# Patient Record
Sex: FEMALE | Race: WHITE | Employment: FULL TIME | URBAN - METROPOLITAN AREA
[De-identification: names, ages, dates, MRNs, and addresses within clinical notes are randomized per-mention and may not be internally consistent; named-entity substitution may affect disease eponyms.]

---

## 2024-08-27 ENCOUNTER — OFFICE VISIT (OUTPATIENT)
Dept: PRIMARY CARE CLINIC | Age: 34
End: 2024-08-27
Payer: MEDICARE

## 2024-08-27 VITALS
WEIGHT: 293 LBS | HEIGHT: 72 IN | DIASTOLIC BLOOD PRESSURE: 88 MMHG | OXYGEN SATURATION: 98 % | BODY MASS INDEX: 39.68 KG/M2 | HEART RATE: 110 BPM | SYSTOLIC BLOOD PRESSURE: 140 MMHG

## 2024-08-27 DIAGNOSIS — Z76.89 ENCOUNTER TO ESTABLISH CARE WITH NEW DOCTOR: Primary | ICD-10-CM

## 2024-08-27 DIAGNOSIS — Z76.89 ENCOUNTER TO ESTABLISH CARE WITH NEW DOCTOR: ICD-10-CM

## 2024-08-27 DIAGNOSIS — R55 PRE-SYNCOPE: ICD-10-CM

## 2024-08-27 DIAGNOSIS — R00.0 TACHYCARDIA: ICD-10-CM

## 2024-08-27 DIAGNOSIS — G43.909 MIGRAINE WITHOUT STATUS MIGRAINOSUS, NOT INTRACTABLE, UNSPECIFIED MIGRAINE TYPE: ICD-10-CM

## 2024-08-27 DIAGNOSIS — F39 MOOD DISORDER (HCC): ICD-10-CM

## 2024-08-27 DIAGNOSIS — F90.9 ADULT ADHD: ICD-10-CM

## 2024-08-27 DIAGNOSIS — R73.9 HYPERGLYCEMIA: ICD-10-CM

## 2024-08-27 DIAGNOSIS — M79.7 FIBROMYALGIA: ICD-10-CM

## 2024-08-27 LAB
ALBUMIN/GLOBULIN RATIO: 2 (ref 1–2.5)
ALBUMIN: 4.6 G/DL (ref 3.5–5.2)
ALP BLD-CCNC: 83 U/L (ref 35–104)
ALT SERPL-CCNC: 42 U/L (ref 10–35)
ANION GAP SERPL CALCULATED.3IONS-SCNC: 11 MMOL/L (ref 9–16)
AST SERPL-CCNC: 28 U/L (ref 10–35)
BASOPHILS ABSOLUTE: 0.07 K/UL (ref 0–0.2)
BASOPHILS RELATIVE PERCENT: 1 % (ref 0–2)
BILIRUB SERPL-MCNC: 0.3 MG/DL (ref 0–1.2)
BUN BLDV-MCNC: 10 MG/DL (ref 6–20)
CALCIUM SERPL-MCNC: 9.6 MG/DL (ref 8.6–10.4)
CHLORIDE BLD-SCNC: 104 MMOL/L (ref 98–107)
CO2: 23 MMOL/L (ref 20–31)
CREAT SERPL-MCNC: 0.8 MG/DL (ref 0.5–0.9)
EOSINOPHILS ABSOLUTE: 0.24 K/UL (ref 0–0.44)
EOSINOPHILS RELATIVE PERCENT: 2 % (ref 1–4)
ESTIMATED AVERAGE GLUCOSE: 100 MG/DL
GFR, ESTIMATED: >90 ML/MIN/1.73M2
GLUCOSE BLD-MCNC: 93 MG/DL (ref 74–99)
HBA1C MFR BLD: 5.1 % (ref 4–6)
HCT VFR BLD CALC: 39.8 % (ref 36.3–47.1)
HEMOGLOBIN: 13.9 G/DL (ref 11.9–15.1)
IMMATURE GRANULOCYTES %: 1 %
IMMATURE GRANULOCYTES ABSOLUTE: 0.05 K/UL (ref 0–0.3)
LYMPHOCYTES ABSOLUTE: 3.42 K/UL (ref 1.1–3.7)
LYMPHOCYTES RELATIVE PERCENT: 32 % (ref 24–43)
MCH RBC QN AUTO: 29.9 PG (ref 25.2–33.5)
MCHC RBC AUTO-ENTMCNC: 34.9 G/DL (ref 28.4–34.8)
MCV RBC AUTO: 85.6 FL (ref 82.6–102.9)
MONOCYTES ABSOLUTE: 0.78 K/UL (ref 0.1–1.2)
MONOCYTES RELATIVE PERCENT: 7 % (ref 3–12)
NEUTROPHILS ABSOLUTE: 6.2 K/UL (ref 1.5–8.1)
NEUTROPHILS RELATIVE PERCENT: 57 % (ref 36–65)
NRBC AUTOMATED: 0 PER 100 WBC
PDW BLD-RTO: 12.3 % (ref 11.8–14.4)
PLATELET # BLD: 484 K/UL (ref 138–453)
PMV BLD AUTO: 9.8 FL (ref 8.1–13.5)
POTASSIUM SERPL-SCNC: 4.4 MMOL/L (ref 3.7–5.3)
RBC # BLD: 4.65 M/UL (ref 3.95–5.11)
SODIUM BLD-SCNC: 138 MMOL/L (ref 136–145)
TOTAL PROTEIN: 7.5 G/DL (ref 6.6–8.7)
TSH SERPL DL<=0.05 MIU/L-ACNC: 2.3 UIU/ML (ref 0.27–4.2)
WBC # BLD: 10.8 K/UL (ref 3.5–11.3)

## 2024-08-27 PROCEDURE — G2211 COMPLEX E/M VISIT ADD ON: HCPCS | Performed by: STUDENT IN AN ORGANIZED HEALTH CARE EDUCATION/TRAINING PROGRAM

## 2024-08-27 PROCEDURE — 93000 ELECTROCARDIOGRAM COMPLETE: CPT | Performed by: STUDENT IN AN ORGANIZED HEALTH CARE EDUCATION/TRAINING PROGRAM

## 2024-08-27 PROCEDURE — 99205 OFFICE O/P NEW HI 60 MIN: CPT | Performed by: STUDENT IN AN ORGANIZED HEALTH CARE EDUCATION/TRAINING PROGRAM

## 2024-08-27 RX ORDER — IBUPROFEN 800 MG/1
800 TABLET, FILM COATED ORAL DAILY
COMMUNITY

## 2024-08-27 RX ORDER — AMITRIPTYLINE HYDROCHLORIDE 50 MG/1
50 TABLET ORAL NIGHTLY
COMMUNITY
Start: 2022-10-19

## 2024-08-27 RX ORDER — ATOMOXETINE 80 MG/1
80 CAPSULE ORAL
COMMUNITY

## 2024-08-27 RX ORDER — RIZATRIPTAN BENZOATE 10 MG/1
TABLET, ORALLY DISINTEGRATING ORAL
COMMUNITY
Start: 2023-01-14

## 2024-08-27 RX ORDER — FREMANEZUMAB-VFRM 225 MG/1.5ML
INJECTION SUBCUTANEOUS
COMMUNITY

## 2024-08-27 RX ORDER — GABAPENTIN 100 MG/1
300 CAPSULE ORAL 3 TIMES DAILY
COMMUNITY
Start: 2024-07-02

## 2024-08-27 RX ORDER — NORETHINDRONE ACETATE 5 MG
5 TABLET ORAL DAILY
COMMUNITY

## 2024-08-27 RX ORDER — LISINOPRIL 40 MG/1
40 TABLET ORAL DAILY
COMMUNITY
Start: 2024-04-22 | End: 2025-04-22

## 2024-08-27 RX ORDER — SPIRONOLACTONE 25 MG/1
25 TABLET ORAL DAILY
COMMUNITY

## 2024-08-27 RX ORDER — VENLAFAXINE HYDROCHLORIDE 150 MG/1
150 CAPSULE, EXTENDED RELEASE ORAL DAILY
COMMUNITY
Start: 2024-07-23

## 2024-08-27 RX ORDER — LORAZEPAM 1 MG/1
0.5 TABLET ORAL DAILY PRN
COMMUNITY

## 2024-08-27 RX ORDER — METHYLPHENIDATE HYDROCHLORIDE 36 MG/1
TABLET, EXTENDED RELEASE ORAL
COMMUNITY
Start: 2024-08-26

## 2024-08-27 SDOH — ECONOMIC STABILITY: INCOME INSECURITY: HOW HARD IS IT FOR YOU TO PAY FOR THE VERY BASICS LIKE FOOD, HOUSING, MEDICAL CARE, AND HEATING?: NOT HARD AT ALL

## 2024-08-27 SDOH — ECONOMIC STABILITY: FOOD INSECURITY: WITHIN THE PAST 12 MONTHS, THE FOOD YOU BOUGHT JUST DIDN'T LAST AND YOU DIDN'T HAVE MONEY TO GET MORE.: NEVER TRUE

## 2024-08-27 SDOH — ECONOMIC STABILITY: FOOD INSECURITY: WITHIN THE PAST 12 MONTHS, YOU WORRIED THAT YOUR FOOD WOULD RUN OUT BEFORE YOU GOT MONEY TO BUY MORE.: NEVER TRUE

## 2024-08-27 ASSESSMENT — PATIENT HEALTH QUESTIONNAIRE - PHQ9
SUM OF ALL RESPONSES TO PHQ QUESTIONS 1-9: 0
SUM OF ALL RESPONSES TO PHQ9 QUESTIONS 1 & 2: 0
1. LITTLE INTEREST OR PLEASURE IN DOING THINGS: NOT AT ALL
2. FEELING DOWN, DEPRESSED OR HOPELESS: NOT AT ALL
SUM OF ALL RESPONSES TO PHQ QUESTIONS 1-9: 0

## 2024-08-27 ASSESSMENT — ENCOUNTER SYMPTOMS
VOMITING: 0
NAUSEA: 0
SHORTNESS OF BREATH: 0
ABDOMINAL PAIN: 0

## 2024-08-27 NOTE — PROGRESS NOTES
[facility]  [facility] PRIMARY CARE  [address]  [place] [address]  Dept: [phone]  Dept Fax: [phone]    Janett Frazier is a 34 y.o. adult who presents today as a new patient for Janett's medical conditions, which are noted below.      Chief Complaint   Patient presents with    New Patient     Patient feels like her heart rate jumps when she bends over. She said she gets light headed easy. Patient said she has been fatigued more. Patient said if she has to walk she gets short of breath very easily. Patient states this been going on since lisinopril was increased from 30 mg to 40 mg daily.    Blister     Blisters on legs        Available notes and recent lab work have been reviewed.    HPI:     New patient to establish care.    Acute concerns:  Elevated heart rate with simple tasks. Has been going on for most of her life.  Gets fatigued sitting at times, has to lie down. Denies any syncope, but has presyncope. Seems to have started 3 weeks ago. 4 months ago lisinopril was increased to 40 mg from 30 mg daily.    PMHx: ADHD - Concerta 36 mg and Strattera (managed by psychiatry), HTN, obesity, migraines (managed by neurology) - well-controlled.  Insomnia - amitriptyline 50 mg nightly; depression/mood - lamictal 200 mg, Effexor 225 mg; PCOS - spironolactone 25 mg; ROBE on CPAP nightly    PSHx: as noted below    FamHx: Grandmother with valve replacement; diabetes on dad's side; lung cancer in dad's side from smoking; possible precancerous lesion on    SocHx:  Living situation: lives with self, 2 indoor cats  Occupation: work from home  Exercise: denies any specific regimen.  Diet: \"Not good.\" ARFID - autism-related. Tries to eat fruits and vegetables. Fast food 3-4 times/week.   Caffeine: denies coffee, 1-2 soda/day, denies tea, maybe 1 energy drinks/supplements  Alcohol: Denies  Tobacco: Denies  Recreational: Admits to marijuana use. Denies heroin or cocaine. (Rarely, maybe  2x/year)      No components found for: \"LDLCHOLESTEROL\", \"LDLCALC\"    (goal LDL is <100)   No results found for: \"AST\", \"ALT\", \"BUN\", \"CR\"  BP Readings from Last 3 Encounters:   08/27/24 (!) 140/88   12/19/15 122/77          (goal 120/80)    Past Medical History:   Diagnosis Date    ADHD (attention deficit hyperactivity disorder)     Asthma     PCOS (polycystic ovarian syndrome)     Personality disorder, depressive       Past Surgical History:   Procedure Laterality Date    FRACTURE SURGERY Right     right ankle, and right hand    TONSILLECTOMY         No family history on file.    Social History     Tobacco Use    Smoking status: Former    Smokeless tobacco: Not on file   Substance Use Topics    Alcohol use: No      Current Outpatient Medications   Medication Sig Dispense Refill    amitriptyline (ELAVIL) 50 MG tablet Take 1 tablet by mouth nightly      atomoxetine (STRATTERA) 80 MG capsule Take 1 capsule by mouth      vitamin D (CHOLECALCIFEROL) 125 MCG (5000 UT) CAPS capsule Take 1 capsule by mouth      AJOVY 225 MG/1.5ML SOAJ ADMINISTER 1.5 ML UNDER THE SKIN EVERY 28 DAYS      gabapentin (NEURONTIN) 100 MG capsule Take 3 capsules by mouth 3 times daily.      ibuprofen (ADVIL;MOTRIN) 800 MG tablet Take 1 tablet by mouth daily      lisinopril (PRINIVIL;ZESTRIL) 40 MG tablet Take 1 tablet by mouth daily      LORazepam (ATIVAN) 1 MG tablet Take 0.5 tablets by mouth daily as needed.      CONCERTA 36 MG extended release tablet       norethindrone (AYGESTIN) 5 MG tablet Take 1 tablet by mouth daily      rizatriptan (MAXALT-MLT) 10 MG disintegrating tablet       spironolactone (ALDACTONE) 25 MG tablet Take 1 tablet by mouth daily      venlafaxine (EFFEXOR XR) 150 MG extended release capsule Take 1 capsule by mouth daily      venlafaxine (EFFEXOR-XR) 75 MG XR capsule Take 1 capsule by mouth daily      lamoTRIgine (LAMICTAL) 200 MG tablet Take 2 tablets by mouth      levomilnacipran (FETZIMA) 40 MG CP24 ER capsule Take 1

## 2024-08-28 ENCOUNTER — APPOINTMENT (OUTPATIENT)
Dept: CT IMAGING | Age: 34
End: 2024-08-28
Payer: MEDICARE

## 2024-08-28 ENCOUNTER — HOSPITAL ENCOUNTER (EMERGENCY)
Age: 34
Discharge: HOME | End: 2024-08-28
Attending: EMERGENCY MEDICINE
Payer: MEDICARE

## 2024-08-28 ENCOUNTER — APPOINTMENT (OUTPATIENT)
Dept: GENERAL RADIOLOGY | Age: 34
End: 2024-08-28
Payer: MEDICARE

## 2024-08-28 VITALS
WEIGHT: 293 LBS | BODY MASS INDEX: 39.68 KG/M2 | HEIGHT: 72 IN | DIASTOLIC BLOOD PRESSURE: 99 MMHG | TEMPERATURE: 98 F | OXYGEN SATURATION: 99 % | HEART RATE: 98 BPM | RESPIRATION RATE: 18 BRPM | SYSTOLIC BLOOD PRESSURE: 157 MMHG

## 2024-08-28 DIAGNOSIS — M26.622 ARTHRALGIA OF LEFT TEMPOROMANDIBULAR JOINT: Primary | ICD-10-CM

## 2024-08-28 PROCEDURE — 99284 EMERGENCY DEPT VISIT MOD MDM: CPT

## 2024-08-28 PROCEDURE — 70110 X-RAY EXAM OF JAW 4/> VIEWS: CPT

## 2024-08-28 PROCEDURE — 70486 CT MAXILLOFACIAL W/O DYE: CPT

## 2024-08-28 ASSESSMENT — ENCOUNTER SYMPTOMS
SHORTNESS OF BREATH: 0
FACIAL SWELLING: 0
ABDOMINAL PAIN: 0
COUGH: 0
NAUSEA: 0
VOICE CHANGE: 0
SORE THROAT: 0
CHEST TIGHTNESS: 0
SINUS PAIN: 0
VOMITING: 0
SINUS PRESSURE: 0
TROUBLE SWALLOWING: 0

## 2024-08-28 ASSESSMENT — PAIN DESCRIPTION - ORIENTATION
ORIENTATION: LEFT
ORIENTATION: LEFT

## 2024-08-28 ASSESSMENT — LIFESTYLE VARIABLES
HOW OFTEN DO YOU HAVE A DRINK CONTAINING ALCOHOL: NEVER
HOW MANY STANDARD DRINKS CONTAINING ALCOHOL DO YOU HAVE ON A TYPICAL DAY: PATIENT DOES NOT DRINK

## 2024-08-28 ASSESSMENT — PAIN DESCRIPTION - LOCATION
LOCATION: JAW
LOCATION: JAW

## 2024-08-28 ASSESSMENT — PAIN SCALES - GENERAL
PAINLEVEL_OUTOF10: 2
PAINLEVEL_OUTOF10: 2

## 2024-08-28 ASSESSMENT — PAIN - FUNCTIONAL ASSESSMENT
PAIN_FUNCTIONAL_ASSESSMENT: 0-10
PAIN_FUNCTIONAL_ASSESSMENT: 0-10

## 2024-08-28 NOTE — ED PROVIDER NOTES
[facility] Emergency Department  [address]  [place] [address]  Phone: [phone]  Fax: [phone]      Attending Physician Attestation    Based on the medical record, the care appears appropriate. I was personally available for consultation in the Emergency Department. I did have a discussion with our midlevel provider regarding the care of this patient.  I reviewed the mid level provider's note and agree with the documented findings and plan of care.   I have reviewed the emergency nurses triage note. I agree with the chief complaint, past medical history, past surgical history, allergies, medications, social and family history as documented unless otherwise noted below.       CHIEF COMPLAINT       Chief Complaint   Patient presents with    Dislocation     Pt reports having a left sided jaw dislocation since 2100 last night has hypermobility denies injury/trauma happened after yawning         PAST MEDICAL HISTORY    has a past medical history of ADHD (attention deficit hyperactivity disorder), Asthma, PCOS (polycystic ovarian syndrome), and Personality disorder, depressive.    SURGICAL HISTORY      has a past surgical history that includes Tonsillectomy and fracture surgery (Right).    CURRENT MEDICATIONS       Previous Medications    AJOVY 225 MG/1.5ML SOAJ    ADMINISTER 1.5 ML UNDER THE SKIN EVERY 28 DAYS    ALBUTEROL (PROVENTIL IN)    Inhale into the lungs Indications: as needed.    AMITRIPTYLINE (ELAVIL) 50 MG TABLET    Take 1 tablet by mouth nightly    ATOMOXETINE (STRATTERA) 80 MG CAPSULE    Take 1 capsule by mouth    CONCERTA 36 MG EXTENDED RELEASE TABLET        GABAPENTIN (NEURONTIN) 100 MG CAPSULE    Take 3 capsules by mouth 3 times daily.    IBUPROFEN (ADVIL;MOTRIN) 800 MG TABLET    Take 1 tablet by mouth daily    LAMOTRIGINE (LAMICTAL) 200 MG TABLET    Take 2 tablets by mouth    LISINOPRIL (PRINIVIL;ZESTRIL) 40 MG TABLET    Take 1 tablet by mouth daily    LORAZEPAM  (ATIVAN) 1 MG TABLET    Take 0.5 tablets by mouth daily as needed.    NORETHINDRONE (AYGESTIN) 5 MG TABLET    Take 1 tablet by mouth daily    RIZATRIPTAN (MAXALT-MLT) 10 MG DISINTEGRATING TABLET        SPIRONOLACTONE (ALDACTONE) 25 MG TABLET    Take 1 tablet by mouth daily    VENLAFAXINE (EFFEXOR XR) 150 MG EXTENDED RELEASE CAPSULE    Take 1 capsule by mouth daily    VENLAFAXINE (EFFEXOR-XR) 75 MG XR CAPSULE    Take 1 capsule by mouth daily    VITAMIN D (CHOLECALCIFEROL) 125 MCG (5000 UT) CAPS CAPSULE    Take 1 capsule by mouth       ALLERGIES     is allergic to zolpidem and adhesive tape.      (Please note that portions of this note were completed with a voice recognition program.  Efforts were made to edit the dictations but occasionally words are mis-transcribed.)    Palmer Han DO, DO  Attending Emergency Physician       Palmer Han DO  08/28/24 0957

## 2024-08-28 NOTE — DISCHARGE INSTRUCTIONS
Please follow-up with your primary care provider.     For pain you can try over the counter acetaminophen or ibuprofen, as needed and as directed. You can continue to apply ice to the affected area indirectly, 20 minutes at a time and up to four times daily.    PLEASE RETURN TO THE EMERGENCY DEPARTMENT IMMEDIATELY if your symptoms worsen in anyway or in 8-12 hours if not improved for re-evaluation.  You should immediately return to the ER for symptoms such as increasing pain, bloody stool, fever, a feeling of passing out, light headed, dizziness, chest pain, shortness of breath, persistent nausea and/or vomiting, numbness or weakness to the arms or legs, coolness or color change of the arms or legs.      Take your medication as indicated and prescribed.  If you are given an antibiotic then, make sure you get the prescription filled and take the antibiotics until finished.      THANK YOU!!!    From [facility] and [facility]    On behalf of the Emergency Department staff at [facility], I would like to thank you for giving us the opportunity to address your health care needs and concerns.    We hope that during your visit, our service was delivered in a professional and caring manner. Please keep [facility] in mind as we walk with you down the path to your own personal wellness.     Please expect an automated text message or email from us so we can ask a few questions about your health and progress. Based on your answers, a clinician may call you back to offer help and instructions.    Please understand that early in the process of an illness or injury, an emergency department workup can be falsely reassuring.  If you notice any worsening, changing or persistent symptoms please call your family doctor or return to the ER immediately.     Tell us how we did during your visit at [email or web address]   and let us know about your experience

## 2024-08-28 NOTE — ED TRIAGE NOTES
Pt in with reported left sided jaw dislocation, pt has hx of hypermobility and has frequent hx of jaw becoming dislocated and is able to pop it back into place but is not able to this time, took motrin and neurontin with no relief, happened last night after yawning approx 2100

## 2024-08-28 NOTE — ED PROVIDER NOTES
[facility] Emergency Department  [address]  [place] [address]  Phone: [phone]  Fax: [phone]        Pt Name: Janett Frazier  MRN: [id number]  Birthdate 1990  Date of evaluation: 8/28/24    CHIEFCOMPLAINT       Chief Complaint   Patient presents with    Dislocation     Pt reports having a left sided jaw dislocation since 2100 last night has hypermobility denies injury/trauma happened after yawning       HISTORY OF PRESENT ILLNESS (Location/Symptom, Timing/Onset, Context/Setting, Quality, Duration, Modifying Factors, Severity)      Janett Frazier is a 34 y.o. adult who presents to the ED via private auto with c/o pain to her left TMJ. Patient states she has had her jaw dislocate with yawning many times in the past due to \"hypermobility\". She reports she did not feel a pop last night with yawning but does feel pain in her left TMJ with certain movements. Took 800 mg of ibuprofen this morning, denies pain currently patient able to speak clearly w/o difficulty. Denies fall or trauma.     PAST MEDICAL / SURGICAL / SOCIAL / FAMILY HISTORY     PMH:  has a past medical history of ADHD (attention deficit hyperactivity disorder), Asthma, PCOS (polycystic ovarian syndrome), and Personality disorder, depressive.  Surgical History:  has a past surgical history that includes Tonsillectomy and fracture surgery (Right).  Social History:  reports that Janett has quit smoking. Janett does not have any smokeless tobacco history on file. Janett reports that Janett does not drink alcohol and does not use drugs.  Family History: has no family status information on file.    family history is not on file.  Psychiatric History: None    Allergies: Zolpidem and Adhesive tape    Home Medications:   Prior to Admission medications    Medication Sig Start Date End Date Taking? Authorizing Provider   ibuprofen (ADVIL;MOTRIN) 800 MG tablet Take 1 tablet by mouth daily   Yes Provider, MD Genny

## 2024-08-28 NOTE — DISCHARGE INSTR - COC
Continuity of Care Form    Patient Name: Janett Frazier   :  1990  MRN:  [id number]    Admit date:  2024  Discharge date:  ***    Code Status Order: No Order   Advance Directives:   Advance Care Flowsheet Documentation             Admitting Physician:  No admitting provider for patient encounter.  PCP: Kameron Antonio DO    Discharging Nurse: ***  Discharging Hospital Unit/Room#: ER10/ER10  Discharging Unit Phone Number: ***    Emergency Contact:   Extended Emergency Contact Information  Primary Emergency Contact: Clarissa Blake  Address: [address] [place] [address] [facility]  Home Phone: [phone]  Relation: Parent  Secondary Emergency Contact: Susan Ramirez  Address: [address]           [address]           [place] [address] [facility]  Home Phone: [phone]  Relation: Brother/Sister    Past Surgical History:  Past Surgical History:   Procedure Laterality Date    FRACTURE SURGERY Right     right ankle, and right hand    TONSILLECTOMY         Immunization History:   Immunization History   Administered Date(s) Administered    COVID-19, MODERNA Bivalent, (age 12y+), IM, 50 mcg/0.5 mL 2021, 2021    COVID-19, PFIZER Bivalent, DO NOT Dilute, (age 12y+), IM, 30 mcg/0.3 mL 2022    COVID-19, PFIZER PURPLE top, DILUTE for use, (age 12 y+), 30mcg/0.3mL 2021    COVID-19, PFIZER, ( formula), (age 12y+), IM, 30mcg/0.3mL 2023       Active Problems:  Patient Active Problem List   Diagnosis Code    Fibromyalgia M79.7    Hyperglycemia R73.9    Mood disorder (HCC) F39    Adult ADHD F90.9    BMI 45.0-49.9, adult (HCC) Z68.42    Migraine without status migrainosus, not intractable G43.909       Isolation/Infection:   Isolation            No Isolation          Patient Infection Status       None to display            Nurse Assessment:  Last Vital Signs: BP (!) 157/99   Pulse 98   Resp 18   Ht 1.829 m (6')   Wt (!)  Belongings:109597987}    RN SIGNATURE:  {Esignature:849071340}    CASE MANAGEMENT/SOCIAL WORK SECTION    Inpatient Status Date: ***    Readmission Risk Assessment Score:  Readmission Risk              Risk of Unplanned Readmission:  0           Discharging to Facility/ Agency   Name:   Address:  Phone:  Fax:    Dialysis Facility (if applicable)   Name:  Address:  Dialysis Schedule:  Phone:  Fax:    / signature: {Esignature:246206557}    PHYSICIAN SECTION    Prognosis: {Prognosis:3748632443}    Condition at Discharge: { Patient Condition:866367310}    Rehab Potential (if transferring to Rehab): {Prognosis:2229762105}    Recommended Labs or Other Treatments After Discharge: ***    Physician Certification: I certify the above information and transfer of Janett Frazier  is necessary for the continuing treatment of the diagnosis listed and that Janett requires {Admit to Appropriate Level of Care:92770} for {GREATER/LESS:846893066} 30 days.     Update Admission H&P: {CHP DME Changes in HandP:296465680}    PHYSICIAN SIGNATURE:  {Esignature:514988638}

## 2024-09-28 SDOH — HEALTH STABILITY: PHYSICAL HEALTH: ON AVERAGE, HOW MANY DAYS PER WEEK DO YOU ENGAGE IN MODERATE TO STRENUOUS EXERCISE (LIKE A BRISK WALK)?: 0 DAYS

## 2024-09-28 ASSESSMENT — PATIENT HEALTH QUESTIONNAIRE - PHQ9
SUM OF ALL RESPONSES TO PHQ QUESTIONS 1-9: 0
SUM OF ALL RESPONSES TO PHQ9 QUESTIONS 1 & 2: 0
2. FEELING DOWN, DEPRESSED OR HOPELESS: NOT AT ALL
1. LITTLE INTEREST OR PLEASURE IN DOING THINGS: NOT AT ALL

## 2024-09-28 ASSESSMENT — LIFESTYLE VARIABLES
HOW MANY STANDARD DRINKS CONTAINING ALCOHOL DO YOU HAVE ON A TYPICAL DAY: 0
HOW OFTEN DO YOU HAVE A DRINK CONTAINING ALCOHOL: 1
HOW MANY STANDARD DRINKS CONTAINING ALCOHOL DO YOU HAVE ON A TYPICAL DAY: PATIENT DOES NOT DRINK
HOW OFTEN DO YOU HAVE A DRINK CONTAINING ALCOHOL: NEVER
HOW OFTEN DO YOU HAVE SIX OR MORE DRINKS ON ONE OCCASION: 1

## 2024-10-01 ENCOUNTER — OFFICE VISIT (OUTPATIENT)
Dept: PRIMARY CARE CLINIC | Age: 34
End: 2024-10-01
Payer: MEDICARE

## 2024-10-01 VITALS
BODY MASS INDEX: 39.68 KG/M2 | SYSTOLIC BLOOD PRESSURE: 142 MMHG | WEIGHT: 293 LBS | DIASTOLIC BLOOD PRESSURE: 86 MMHG | OXYGEN SATURATION: 96 % | HEIGHT: 72 IN | HEART RATE: 115 BPM

## 2024-10-01 DIAGNOSIS — Z00.00 WELCOME TO MEDICARE PREVENTIVE VISIT: Primary | ICD-10-CM

## 2024-10-01 DIAGNOSIS — I10 PRIMARY HYPERTENSION: ICD-10-CM

## 2024-10-01 DIAGNOSIS — L13.9: ICD-10-CM

## 2024-10-01 PROCEDURE — 3077F SYST BP >= 140 MM HG: CPT | Performed by: STUDENT IN AN ORGANIZED HEALTH CARE EDUCATION/TRAINING PROGRAM

## 2024-10-01 PROCEDURE — 3079F DIAST BP 80-89 MM HG: CPT | Performed by: STUDENT IN AN ORGANIZED HEALTH CARE EDUCATION/TRAINING PROGRAM

## 2024-10-01 PROCEDURE — 99214 OFFICE O/P EST MOD 30 MIN: CPT | Performed by: STUDENT IN AN ORGANIZED HEALTH CARE EDUCATION/TRAINING PROGRAM

## 2024-10-01 PROCEDURE — G0402 INITIAL PREVENTIVE EXAM: HCPCS | Performed by: STUDENT IN AN ORGANIZED HEALTH CARE EDUCATION/TRAINING PROGRAM

## 2024-10-01 RX ORDER — ATOMOXETINE HYDROCHLORIDE 40 MG/1
40 CAPSULE ORAL DAILY
COMMUNITY
Start: 2024-09-23

## 2024-10-01 NOTE — PATIENT INSTRUCTIONS
device in the bathroom with you.   Where can you learn more?  Go to https://www.[employer].net/patientEd and enter G117 to learn more about \"Preventing Falls: Care Instructions.\"  Current as of: July 17, 2023  Content Version: 14.1  © 5547-4886 [employer].   Care instructions adapted under license by [employer]. If you have questions about a medical condition or this instruction, always ask your healthcare professional. [employer] disclaims any warranty or liability for your use of this information.           Fatigue: Care Instructions  Overview     Fatigue is a feeling of tiredness, exhaustion, or lack of energy. You may feel fatigue because of too much or not enough activity. It can also come from stress, lack of sleep, boredom, and poor diet. Many medical problems, such as viral infections, can cause fatigue. Emotional problems, especially depression, are often the cause of fatigue.  Fatigue is most often a symptom of another problem. Treatment for fatigue depends on the cause. For example, if you have fatigue because you have a certain health problem, treating this problem also treats your fatigue. If depression or anxiety is the cause, treatment may help.  Follow-up care is a key part of your treatment and safety. Be sure to make and go to all appointments, and call your doctor if you are having problems. It's also a good idea to know your test results and keep a list of the medicines you take.  How can you care for yourself at home?  Get regular exercise. But try not to overdo it. It may help to go back and forth between rest and exercise.  Get plenty of rest.  Eat a variety of healthy foods. Try not to skip any meals.  Avoid or try to cut back on your use of caffeine, tobacco, and alcohol. Caffeine is most often found in coffee, tea, cola drinks, and energy drinks.  Limit medicines that can cause fatigue. These include medicines such as cold and allergy medicines.  When should

## 2024-10-01 NOTE — PROGRESS NOTES
Medicare Annual Wellness Visit    Janett Frazier is here for Medicare AWV (Medicare wellness )    Assessment & Plan   Welcome to Medicare preventive visit  Skin disease, bullous  -     [facility] - Harriet Gibson MD, Dermatology, [place]  Primary hypertension    BP in office is 142/86, which is not at target with lisinopril 40 mg daily, spironolactone 25 mg daily. Advised patient to log blood pressures once/daily. Bring with her to next appointment with cuff. Will consider increasing spironolactone if not better controlled.    For skin bulla, will refer to dermatology for management. Suspect IgA or connective tissue disorder.     Otherwise, patient appears to be in fair health. Normal clock draw and word recall. 3/3 on second attempt.     Will reach out to see if there are dieticians in [facility] who specialize or have background in eating disorders, per patient request.    Recommendations for Preventive Services Due: see orders and patient instructions/AVS.  Recommended screening schedule for the next 5-10 years is provided to the patient in written form: see Patient Instructions/AVS.     Return in about 1 month (around 11/1/2024) for Blood pressure recheck.     Subjective   The following acute and/or chronic problems were also addressed today:  Blistering of the LE, scar formation, chronic.    Patient's complete Health Risk Assessment and screening values have been reviewed and are found in Flowsheets. The following problems were reviewed today and where indicated follow up appointments were made and/or referrals ordered.    Positive Risk Factor Screenings with Interventions:    Fall Risk:  Do you feel unsteady or are you worried about falling? : (!) yes  2 or more falls in past year?: no  Fall with injury in past year?: no     Interventions:    Reviewed medications, home hazards, visual acuity, and co-morbidities that can increase risk for falls    Cognitive:   Clock Drawing Test (CDT): (!) Abnormal  Words recalled: 2

## 2024-11-05 ENCOUNTER — OFFICE VISIT (OUTPATIENT)
Dept: PRIMARY CARE CLINIC | Age: 34
End: 2024-11-05

## 2024-11-05 VITALS
HEART RATE: 109 BPM | WEIGHT: 293 LBS | OXYGEN SATURATION: 96 % | SYSTOLIC BLOOD PRESSURE: 138 MMHG | BODY MASS INDEX: 39.68 KG/M2 | HEIGHT: 72 IN | DIASTOLIC BLOOD PRESSURE: 94 MMHG

## 2024-11-05 DIAGNOSIS — E28.2 PCOS (POLYCYSTIC OVARIAN SYNDROME): ICD-10-CM

## 2024-11-05 DIAGNOSIS — I10 PRIMARY HYPERTENSION: Primary | ICD-10-CM

## 2024-11-05 DIAGNOSIS — I10 PRIMARY HYPERTENSION: ICD-10-CM

## 2024-11-05 DIAGNOSIS — K76.0 HEPATIC STEATOSIS: ICD-10-CM

## 2024-11-05 RX ORDER — SPIRONOLACTONE 50 MG/1
50 TABLET, FILM COATED ORAL DAILY
Qty: 30 TABLET | Refills: 2 | Status: SHIPPED | OUTPATIENT
Start: 2024-11-05

## 2024-11-05 RX ORDER — SPIRONOLACTONE 50 MG/1
50 TABLET, FILM COATED ORAL DAILY
Qty: 90 TABLET | OUTPATIENT
Start: 2024-11-05

## 2024-11-05 ASSESSMENT — ENCOUNTER SYMPTOMS
ABDOMINAL PAIN: 0
SHORTNESS OF BREATH: 0
NAUSEA: 0
VOMITING: 0

## 2024-11-05 NOTE — PROGRESS NOTES
[facility]  [facility] PRIMARY CARE  [address] [address]  Dept: [phone]    Janett Frazier is a 34 y.o. adult established patient, who presents today for Janett's medical conditions/complaints as noted below:    Chief Complaint   Patient presents with    Hypertension     Htn  check up. Patient states her BP has been good at home. 120'/80's        HPI:     BP recheck. Reports good levels at home. Currently managed with lisinopril 40 mg daily and spironolactone 25 mg daily.    Reviewed prior notes: previous PCP  Reviewed previous labs.    No components found for: \"LDLCHOLESTEROL\", \"LDLCALC\"    (goal LDL is <100)   AST (U/L)   Date Value   08/27/2024 28     ALT (U/L)   Date Value   08/27/2024 42 (H)     BUN (mg/dL)   Date Value   08/27/2024 10     Hemoglobin A1C (%)   Date Value   08/27/2024 5.1     TSH (uIU/mL)   Date Value   08/27/2024 2.30     BP Readings from Last 3 Encounters:   11/05/24 (!) 138/94   10/01/24 (!) 142/86   08/28/24 (!) 157/99          (goal 120/80)    Past Medical History:   Diagnosis Date    ADHD (attention deficit hyperactivity disorder)     Anxiety 1997    Asthma     Fibromyalgia 2018    Headache 2006    Hyperlipidemia 2019    Hypertension 2021    Irritable bowel syndrome 2021    Osteoarthritis 2023    PCOS (polycystic ovarian syndrome)     Personality disorder, depressive     Sleep apnea 2019      Past Surgical History:   Procedure Laterality Date    COSMETIC SURGERY  2020    Breast reduction    FRACTURE SURGERY Right     right ankle, and right hand    TONSILLECTOMY         Family History   Problem Relation Age of Onset    Depression Mother     High Blood Pressure Mother     High Cholesterol Mother     Mental Illness Mother     Miscarriages / Stillbirths Mother     Alcohol Abuse Father     Depression Father     Diabetes Father     High Blood Pressure Father     High Cholesterol Father     Mental Illness Father     Substance Abuse Father

## 2024-11-19 ENCOUNTER — TELEPHONE (OUTPATIENT)
Dept: PRIMARY CARE CLINIC | Age: 34
End: 2024-11-19

## 2024-11-19 ENCOUNTER — HOSPITAL ENCOUNTER (OUTPATIENT)
Dept: ULTRASOUND IMAGING | Age: 34
Discharge: HOME | End: 2024-11-21
Payer: MEDICARE

## 2024-11-19 DIAGNOSIS — K76.0 HEPATIC STEATOSIS: ICD-10-CM

## 2024-11-19 DIAGNOSIS — K76.0 FATTY (CHANGE OF) LIVER, NOT ELSEWHERE CLASSIFIED: ICD-10-CM

## 2024-11-19 DIAGNOSIS — R16.0 LIVER MASS: Primary | ICD-10-CM

## 2024-11-19 DIAGNOSIS — K74.00 HEPATIC FIBROSIS: ICD-10-CM

## 2024-11-19 PROCEDURE — 76705 ECHO EXAM OF ABDOMEN: CPT

## 2024-11-19 PROCEDURE — 76981 USE PARENCHYMA: CPT

## 2024-11-19 RX ORDER — VITAMIN E 268 MG
400 CAPSULE ORAL 2 TIMES DAILY
Qty: 60 CAPSULE | Refills: 5 | Status: SHIPPED | OUTPATIENT
Start: 2024-11-19

## 2024-11-19 NOTE — TELEPHONE ENCOUNTER
Called patient to notify of her liver u/s results and she has an order for an MRI. Left a voicemail to call the office.

## 2024-12-12 ENCOUNTER — HOSPITAL ENCOUNTER (OUTPATIENT)
Dept: MRI IMAGING | Age: 34
Discharge: HOME | End: 2024-12-14
Attending: STUDENT IN AN ORGANIZED HEALTH CARE EDUCATION/TRAINING PROGRAM
Payer: MEDICARE

## 2024-12-12 DIAGNOSIS — R16.0 LIVER MASS: ICD-10-CM

## 2024-12-12 PROCEDURE — 2580000003 HC RX 258: Performed by: STUDENT IN AN ORGANIZED HEALTH CARE EDUCATION/TRAINING PROGRAM

## 2024-12-12 PROCEDURE — A9581 GADOXETATE DISODIUM INJ: HCPCS | Performed by: STUDENT IN AN ORGANIZED HEALTH CARE EDUCATION/TRAINING PROGRAM

## 2024-12-12 PROCEDURE — 74183 MRI ABD W/O CNTR FLWD CNTR: CPT

## 2024-12-12 PROCEDURE — 82565 ASSAY OF CREATININE: CPT

## 2024-12-12 PROCEDURE — 6360000004 HC RX CONTRAST MEDICATION: Performed by: STUDENT IN AN ORGANIZED HEALTH CARE EDUCATION/TRAINING PROGRAM

## 2024-12-12 RX ORDER — SODIUM CHLORIDE 0.9 % (FLUSH) 0.9 %
10 SYRINGE (ML) INJECTION ONCE
Status: COMPLETED | OUTPATIENT
Start: 2024-12-12 | End: 2024-12-12

## 2024-12-12 RX ORDER — 0.9 % SODIUM CHLORIDE 0.9 %
80 INTRAVENOUS SOLUTION INTRAVENOUS ONCE
Status: DISCONTINUED | OUTPATIENT
Start: 2024-12-12 | End: 2024-12-15 | Stop reason: HOSPADM

## 2024-12-12 RX ADMIN — Medication 80 ML: at 14:23

## 2024-12-12 RX ADMIN — SODIUM CHLORIDE, PRESERVATIVE FREE 10 ML: 5 INJECTION INTRAVENOUS at 14:24

## 2024-12-12 RX ADMIN — GADOXETATE DISODIUM 10 ML: 181.43 INJECTION, SOLUTION INTRAVENOUS at 14:23

## 2024-12-13 LAB — CREAT BLD-MCNC: 1 MG/DL (ref 0.6–1.4)

## 2024-12-17 ENCOUNTER — OFFICE VISIT (OUTPATIENT)
Dept: PRIMARY CARE CLINIC | Age: 34
End: 2024-12-17
Payer: MEDICARE

## 2024-12-17 VITALS
HEIGHT: 72 IN | OXYGEN SATURATION: 99 % | WEIGHT: 293 LBS | BODY MASS INDEX: 39.68 KG/M2 | SYSTOLIC BLOOD PRESSURE: 128 MMHG | HEART RATE: 97 BPM | DIASTOLIC BLOOD PRESSURE: 90 MMHG

## 2024-12-17 DIAGNOSIS — E28.2 PCOS (POLYCYSTIC OVARIAN SYNDROME): ICD-10-CM

## 2024-12-17 DIAGNOSIS — I10 PRIMARY HYPERTENSION: Primary | ICD-10-CM

## 2024-12-17 DIAGNOSIS — K76.0 HEPATIC STEATOSIS: ICD-10-CM

## 2024-12-17 DIAGNOSIS — I10 PRIMARY HYPERTENSION: ICD-10-CM

## 2024-12-17 DIAGNOSIS — R93.2 ABNORMAL FINDING ON IMAGING OF LIVER: ICD-10-CM

## 2024-12-17 DIAGNOSIS — K74.00 FIBROSIS OF LIVER: ICD-10-CM

## 2024-12-17 PROCEDURE — G2211 COMPLEX E/M VISIT ADD ON: HCPCS | Performed by: STUDENT IN AN ORGANIZED HEALTH CARE EDUCATION/TRAINING PROGRAM

## 2024-12-17 PROCEDURE — 99214 OFFICE O/P EST MOD 30 MIN: CPT | Performed by: STUDENT IN AN ORGANIZED HEALTH CARE EDUCATION/TRAINING PROGRAM

## 2024-12-17 PROCEDURE — 3080F DIAST BP >= 90 MM HG: CPT | Performed by: STUDENT IN AN ORGANIZED HEALTH CARE EDUCATION/TRAINING PROGRAM

## 2024-12-17 PROCEDURE — 3074F SYST BP LT 130 MM HG: CPT | Performed by: STUDENT IN AN ORGANIZED HEALTH CARE EDUCATION/TRAINING PROGRAM

## 2024-12-17 RX ORDER — SPIRONOLACTONE 100 MG/1
100 TABLET, FILM COATED ORAL DAILY
Qty: 30 TABLET | Refills: 2 | Status: SHIPPED | OUTPATIENT
Start: 2024-12-17 | End: 2024-12-18

## 2024-12-17 ASSESSMENT — ENCOUNTER SYMPTOMS
VOMITING: 0
NAUSEA: 0
SHORTNESS OF BREATH: 0
ABDOMINAL PAIN: 0

## 2024-12-17 NOTE — PROGRESS NOTES
liver finding  Continue vitamin E and lifestyle changes for hepatic steatosis with fibrosis    Return in about 1 month (around 1/17/2025) for BP recheck, medication adjustment.    Orders Placed This Encounter   Procedures    MRI ABDOMEN W WO CONTRAST     Standing Status:   Future     Standing Expiration Date:   12/17/2025     Order Specific Question:   STAT Creatinine as needed:     Answer:   Yes     Order Specific Question:   Reason for exam:     Answer:   Abnormal finding on MRI of liver. Requires re-imaging 3-6 months after.     Order Specific Question:   What is the sedation requirement?     Answer:   None     Order Specific Question:   Specify organ?     Answer:   Liver     Orders Placed This Encounter   Medications    spironolactone (ALDACTONE) 100 MG tablet     Sig: Take 1 tablet by mouth daily     Dispense:  30 tablet     Refill:  2           Discussed risks and benefits of above plan. All patient questions were answered prior to patient leaving the office, today. Reviewed health maintenance. Instructed regarding current medications, diet, and exercise. Patient agreed with treatment plan.    Electronically signed by Kameron Antonio DO on 12/17/2024 at 5:29 PM

## 2024-12-18 RX ORDER — SPIRONOLACTONE 100 MG/1
100 TABLET, FILM COATED ORAL DAILY
Qty: 90 TABLET | Refills: 1 | Status: SHIPPED | OUTPATIENT
Start: 2024-12-18 | End: 2024-12-19

## 2024-12-19 ENCOUNTER — OFFICE VISIT (OUTPATIENT)
Age: 34
End: 2024-12-19
Payer: MEDICARE

## 2024-12-19 VITALS
WEIGHT: 293 LBS | HEART RATE: 101 BPM | OXYGEN SATURATION: 98 % | DIASTOLIC BLOOD PRESSURE: 99 MMHG | SYSTOLIC BLOOD PRESSURE: 160 MMHG | BODY MASS INDEX: 49.82 KG/M2

## 2024-12-19 DIAGNOSIS — I10 PRIMARY HYPERTENSION: Primary | ICD-10-CM

## 2024-12-19 DIAGNOSIS — E28.2 PCOS (POLYCYSTIC OVARIAN SYNDROME): ICD-10-CM

## 2024-12-19 PROCEDURE — 99204 OFFICE O/P NEW MOD 45 MIN: CPT | Performed by: INTERNAL MEDICINE

## 2024-12-19 PROCEDURE — 3080F DIAST BP >= 90 MM HG: CPT | Performed by: INTERNAL MEDICINE

## 2024-12-19 PROCEDURE — 3077F SYST BP >= 140 MM HG: CPT | Performed by: INTERNAL MEDICINE

## 2024-12-19 PROCEDURE — 93000 ELECTROCARDIOGRAM COMPLETE: CPT | Performed by: INTERNAL MEDICINE

## 2024-12-19 RX ORDER — METOPROLOL SUCCINATE 25 MG/1
25 TABLET, EXTENDED RELEASE ORAL DAILY
Qty: 30 TABLET | Refills: 3 | Status: SHIPPED | OUTPATIENT
Start: 2024-12-19

## 2024-12-19 RX ORDER — SPIRONOLACTONE 100 MG/1
50 TABLET, FILM COATED ORAL DAILY
Qty: 90 TABLET | Refills: 1 | Status: SHIPPED | OUTPATIENT
Start: 2024-12-19

## 2025-01-13 SDOH — ECONOMIC STABILITY: FOOD INSECURITY: WITHIN THE PAST 12 MONTHS, YOU WORRIED THAT YOUR FOOD WOULD RUN OUT BEFORE YOU GOT MONEY TO BUY MORE.: NEVER TRUE

## 2025-01-13 SDOH — ECONOMIC STABILITY: INCOME INSECURITY: IN THE LAST 12 MONTHS, WAS THERE A TIME WHEN YOU WERE NOT ABLE TO PAY THE MORTGAGE OR RENT ON TIME?: NO

## 2025-01-13 SDOH — ECONOMIC STABILITY: FOOD INSECURITY: WITHIN THE PAST 12 MONTHS, THE FOOD YOU BOUGHT JUST DIDN'T LAST AND YOU DIDN'T HAVE MONEY TO GET MORE.: NEVER TRUE

## 2025-01-13 SDOH — ECONOMIC STABILITY: TRANSPORTATION INSECURITY
IN THE PAST 12 MONTHS, HAS LACK OF TRANSPORTATION KEPT YOU FROM MEETINGS, WORK, OR FROM GETTING THINGS NEEDED FOR DAILY LIVING?: NO

## 2025-01-13 SDOH — ECONOMIC STABILITY: TRANSPORTATION INSECURITY
IN THE PAST 12 MONTHS, HAS THE LACK OF TRANSPORTATION KEPT YOU FROM MEDICAL APPOINTMENTS OR FROM GETTING MEDICATIONS?: NO

## 2025-01-13 ASSESSMENT — PATIENT HEALTH QUESTIONNAIRE - PHQ9
SUM OF ALL RESPONSES TO PHQ9 QUESTIONS 1 & 2: 0
SUM OF ALL RESPONSES TO PHQ9 QUESTIONS 1 & 2: 0
SUM OF ALL RESPONSES TO PHQ QUESTIONS 1-9: 0
2. FEELING DOWN, DEPRESSED OR HOPELESS: NOT AT ALL
SUM OF ALL RESPONSES TO PHQ QUESTIONS 1-9: 0
1. LITTLE INTEREST OR PLEASURE IN DOING THINGS: NOT AT ALL
1. LITTLE INTEREST OR PLEASURE IN DOING THINGS: NOT AT ALL
2. FEELING DOWN, DEPRESSED OR HOPELESS: NOT AT ALL

## 2025-01-14 ENCOUNTER — OFFICE VISIT (OUTPATIENT)
Dept: PRIMARY CARE CLINIC | Age: 35
End: 2025-01-14
Payer: MEDICARE

## 2025-01-14 VITALS
SYSTOLIC BLOOD PRESSURE: 128 MMHG | DIASTOLIC BLOOD PRESSURE: 82 MMHG | HEART RATE: 102 BPM | HEIGHT: 72 IN | OXYGEN SATURATION: 100 % | BODY MASS INDEX: 39.68 KG/M2 | WEIGHT: 293 LBS

## 2025-01-14 DIAGNOSIS — I10 PRIMARY HYPERTENSION: Primary | ICD-10-CM

## 2025-01-14 PROCEDURE — 3079F DIAST BP 80-89 MM HG: CPT | Performed by: STUDENT IN AN ORGANIZED HEALTH CARE EDUCATION/TRAINING PROGRAM

## 2025-01-14 PROCEDURE — 99213 OFFICE O/P EST LOW 20 MIN: CPT | Performed by: STUDENT IN AN ORGANIZED HEALTH CARE EDUCATION/TRAINING PROGRAM

## 2025-01-14 PROCEDURE — 3074F SYST BP LT 130 MM HG: CPT | Performed by: STUDENT IN AN ORGANIZED HEALTH CARE EDUCATION/TRAINING PROGRAM

## 2025-01-14 PROCEDURE — G2211 COMPLEX E/M VISIT ADD ON: HCPCS | Performed by: STUDENT IN AN ORGANIZED HEALTH CARE EDUCATION/TRAINING PROGRAM

## 2025-01-14 RX ORDER — POLYETHYLENE GLYCOL 3350 17 G/17G
17 POWDER, FOR SOLUTION ORAL DAILY PRN
COMMUNITY

## 2025-01-14 SDOH — ECONOMIC STABILITY: FOOD INSECURITY: WITHIN THE PAST 12 MONTHS, YOU WORRIED THAT YOUR FOOD WOULD RUN OUT BEFORE YOU GOT MONEY TO BUY MORE.: NEVER TRUE

## 2025-01-14 SDOH — ECONOMIC STABILITY: FOOD INSECURITY: WITHIN THE PAST 12 MONTHS, THE FOOD YOU BOUGHT JUST DIDN'T LAST AND YOU DIDN'T HAVE MONEY TO GET MORE.: NEVER TRUE

## 2025-01-14 ASSESSMENT — ENCOUNTER SYMPTOMS
SHORTNESS OF BREATH: 0
ABDOMINAL PAIN: 0
VOMITING: 0
NAUSEA: 0

## 2025-01-14 NOTE — PROGRESS NOTES
and leg swelling.   Gastrointestinal:  Negative for abdominal pain, nausea and vomiting.   Neurological:  Negative for dizziness and light-headedness.       Objective:     /82   Pulse (!) 102   Ht 1.829 m (6' 0.01\")   Wt (!) 162.8 kg (359 lb)   SpO2 100%   BMI 48.68 kg/m²   Physical Exam  Vitals and nursing note reviewed.   Constitutional:       General: Janett is not in acute distress.     Appearance: Janett is not ill-appearing.   HENT:      Head: Atraumatic.   Eyes:      Extraocular Movements: Extraocular movements intact.      Pupils: Pupils are equal, round, and reactive to light.   Cardiovascular:      Rate and Rhythm: Normal rate and regular rhythm.      Heart sounds: No murmur heard.     No friction rub. No gallop.   Pulmonary:      Effort: Pulmonary effort is normal. No respiratory distress.      Breath sounds: Normal breath sounds. No wheezing, rhonchi or rales.   Musculoskeletal:      Right lower leg: No edema.      Left lower leg: No edema.   Neurological:      Mental Status: Janett is alert.   Psychiatric:         Mood and Affect: Mood normal.         Behavior: Behavior normal.         Thought Content: Thought content normal.         Judgment: Judgment normal.         Assessment:       Diagnosis Orders   1. Primary hypertension           BP in office is 128/82, which is at target with spironolactone 25 mg daily, lisinopril 40 mg daily, and metoprolol XL 25 mg daily. Tolerates medication without issue. Adherent to compression stocking use, which is helping with presyncopal symptoms.    Plan:   Continue HTN regimen  Continue compression stockings  Obtain repeat MRI in 3 months    Return in about 6 months (around 7/14/2025).    No orders of the defined types were placed in this encounter.    No orders of the defined types were placed in this encounter.          Discussed risks and benefits of above plan. All patient questions were answered prior to patient leaving the office, today. Reviewed

## 2025-01-17 DIAGNOSIS — I10 PRIMARY HYPERTENSION: ICD-10-CM

## 2025-01-17 DIAGNOSIS — E28.2 PCOS (POLYCYSTIC OVARIAN SYNDROME): ICD-10-CM

## 2025-01-17 RX ORDER — SPIRONOLACTONE 100 MG/1
50 TABLET, FILM COATED ORAL DAILY
Qty: 90 TABLET | Refills: 1 | Status: SHIPPED | OUTPATIENT
Start: 2025-01-17

## 2025-03-20 RX ORDER — METOPROLOL SUCCINATE 25 MG/1
25 TABLET, EXTENDED RELEASE ORAL DAILY
Qty: 90 TABLET | Refills: 3 | Status: SHIPPED | OUTPATIENT
Start: 2025-03-20

## 2025-03-21 RX ORDER — METOPROLOL SUCCINATE 25 MG/1
25 TABLET, EXTENDED RELEASE ORAL DAILY
Qty: 30 TABLET | Refills: 3 | OUTPATIENT
Start: 2025-03-21

## 2025-04-01 ENCOUNTER — OFFICE VISIT (OUTPATIENT)
Age: 35
End: 2025-04-01
Payer: MEDICARE

## 2025-04-01 VITALS
SYSTOLIC BLOOD PRESSURE: 149 MMHG | BODY MASS INDEX: 49.22 KG/M2 | HEART RATE: 111 BPM | WEIGHT: 293 LBS | DIASTOLIC BLOOD PRESSURE: 90 MMHG | OXYGEN SATURATION: 97 %

## 2025-04-01 DIAGNOSIS — I10 PRIMARY HYPERTENSION: Primary | ICD-10-CM

## 2025-04-01 DIAGNOSIS — I95.1 ORTHOSTATIC HYPOTENSION: ICD-10-CM

## 2025-04-01 DIAGNOSIS — F90.9 ADULT ADHD: ICD-10-CM

## 2025-04-01 DIAGNOSIS — F39 MOOD DISORDER: ICD-10-CM

## 2025-04-01 PROCEDURE — 3077F SYST BP >= 140 MM HG: CPT

## 2025-04-01 PROCEDURE — 3080F DIAST BP >= 90 MM HG: CPT

## 2025-04-01 PROCEDURE — 99214 OFFICE O/P EST MOD 30 MIN: CPT

## 2025-04-01 PROCEDURE — 93000 ELECTROCARDIOGRAM COMPLETE: CPT

## 2025-04-01 ASSESSMENT — ENCOUNTER SYMPTOMS: SHORTNESS OF BREATH: 1

## 2025-04-01 NOTE — PROGRESS NOTES
capsules by mouth 3 times daily., Disp: , Rfl:     ibuprofen (ADVIL;MOTRIN) 800 MG tablet, Take 1 tablet by mouth daily, Disp: , Rfl:     lisinopril (PRINIVIL;ZESTRIL) 40 MG tablet, Take 1 tablet by mouth daily, Disp: , Rfl:     LORazepam (ATIVAN) 1 MG tablet, Take 0.5 tablets by mouth daily as needed., Disp: , Rfl:     CONCERTA 36 MG extended release tablet, , Disp: , Rfl:     rizatriptan (MAXALT-MLT) 10 MG disintegrating tablet, , Disp: , Rfl:     venlafaxine (EFFEXOR XR) 150 MG extended release capsule, Take 1 capsule by mouth daily, Disp: , Rfl:     venlafaxine (EFFEXOR-XR) 75 MG XR capsule, Take 1 capsule by mouth daily, Disp: , Rfl:     lamoTRIgine (LAMICTAL) 200 MG tablet, Take 2 tablets by mouth, Disp: , Rfl:      Physical Exam:   Vitals: There were no vitals taken for this visit.    Physical Exam  HENT:      Head: Normocephalic.   Cardiovascular:      Rate and Rhythm: Regular rhythm. Tachycardia present.      Heart sounds: Normal heart sounds.   Pulmonary:      Breath sounds: Normal breath sounds.   Skin:     General: Skin is warm and dry.   Neurological:      Mental Status: She is alert and oriented to person, place, and time.   Psychiatric:         Mood and Affect: Mood normal.           Labs:  No results found for: \"CHOL\", \"TRIG\", \"HDL\", \"VLDL\", \"CHOLHDLRATIO\"    Lab Results   Component Value Date     08/27/2024    K 4.4 08/27/2024     08/27/2024    CO2 23 08/27/2024    BUN 10 08/27/2024    CREATININE 1.0 12/12/2024    GLUCOSE 93 08/27/2024    CALCIUM 9.6 08/27/2024    BILITOT 0.3 08/27/2024    ALKPHOS 83 08/27/2024    AST 28 08/27/2024    ALT 42 (H) 08/27/2024    LABGLOM >90 08/27/2024       EKG 4/1/2025:   Sinus Tachycardia   WITHIN NORMAL LIMITS    ECHO: 2023  The left ventricle is normal size. Global   left ventricular systolic function is hyperdynamic. The EF is 65 %   visually. Left ventricular wall thickness is normal. No regional wall   motion abnormality. Normal diastolic function.

## 2025-04-30 ENCOUNTER — PATIENT MESSAGE (OUTPATIENT)
Dept: PRIMARY CARE CLINIC | Age: 35
End: 2025-04-30

## 2025-04-30 DIAGNOSIS — I10 PRIMARY HYPERTENSION: Primary | ICD-10-CM

## 2025-05-01 RX ORDER — LISINOPRIL 40 MG/1
40 TABLET ORAL DAILY
Qty: 90 TABLET | Refills: 1 | Status: SHIPPED | OUTPATIENT
Start: 2025-05-01

## 2025-05-02 ENCOUNTER — HOSPITAL ENCOUNTER (OUTPATIENT)
Dept: MRI IMAGING | Age: 35
Discharge: HOME | End: 2025-05-02
Attending: STUDENT IN AN ORGANIZED HEALTH CARE EDUCATION/TRAINING PROGRAM
Payer: MEDICARE

## 2025-05-02 DIAGNOSIS — R93.2 ABNORMAL FINDING ON IMAGING OF LIVER: ICD-10-CM

## 2025-05-02 PROCEDURE — 74183 MRI ABD W/O CNTR FLWD CNTR: CPT

## 2025-05-02 PROCEDURE — 6360000004 HC RX CONTRAST MEDICATION: Performed by: STUDENT IN AN ORGANIZED HEALTH CARE EDUCATION/TRAINING PROGRAM

## 2025-05-02 PROCEDURE — 2500000003 HC RX 250 WO HCPCS: Performed by: STUDENT IN AN ORGANIZED HEALTH CARE EDUCATION/TRAINING PROGRAM

## 2025-05-02 PROCEDURE — 2580000003 HC RX 258: Performed by: STUDENT IN AN ORGANIZED HEALTH CARE EDUCATION/TRAINING PROGRAM

## 2025-05-02 PROCEDURE — A9579 GAD-BASE MR CONTRAST NOS,1ML: HCPCS | Performed by: STUDENT IN AN ORGANIZED HEALTH CARE EDUCATION/TRAINING PROGRAM

## 2025-05-02 RX ORDER — 0.9 % SODIUM CHLORIDE 0.9 %
50 INTRAVENOUS SOLUTION INTRAVENOUS ONCE
Status: COMPLETED | OUTPATIENT
Start: 2025-05-02 | End: 2025-05-02

## 2025-05-02 RX ORDER — SODIUM CHLORIDE 0.9 % (FLUSH) 0.9 %
10 SYRINGE (ML) INJECTION PRN
Status: ACTIVE | OUTPATIENT
Start: 2025-05-02

## 2025-05-02 RX ADMIN — GADOTERIDOL 20 ML: 279.3 INJECTION, SOLUTION INTRAVENOUS at 17:47

## 2025-05-02 RX ADMIN — SODIUM CHLORIDE, PRESERVATIVE FREE 10 ML: 5 INJECTION INTRAVENOUS at 17:48

## 2025-05-02 RX ADMIN — SODIUM CHLORIDE 50 ML: 9 INJECTION, SOLUTION INTRAVENOUS at 17:48

## 2025-05-07 ENCOUNTER — RESULTS FOLLOW-UP (OUTPATIENT)
Dept: PRIMARY CARE CLINIC | Age: 35
End: 2025-05-07

## 2025-07-14 SDOH — HEALTH STABILITY: PHYSICAL HEALTH: ON AVERAGE, HOW MANY DAYS PER WEEK DO YOU ENGAGE IN MODERATE TO STRENUOUS EXERCISE (LIKE A BRISK WALK)?: 0 DAYS

## 2025-07-14 ASSESSMENT — PATIENT HEALTH QUESTIONNAIRE - PHQ9
1. LITTLE INTEREST OR PLEASURE IN DOING THINGS: NOT AT ALL
SUM OF ALL RESPONSES TO PHQ QUESTIONS 1-9: 0
2. FEELING DOWN, DEPRESSED OR HOPELESS: NOT AT ALL
SUM OF ALL RESPONSES TO PHQ QUESTIONS 1-9: 0

## 2025-07-14 ASSESSMENT — LIFESTYLE VARIABLES
HOW MANY STANDARD DRINKS CONTAINING ALCOHOL DO YOU HAVE ON A TYPICAL DAY: 0
HOW OFTEN DO YOU HAVE A DRINK CONTAINING ALCOHOL: 1
HOW OFTEN DO YOU HAVE SIX OR MORE DRINKS ON ONE OCCASION: 1
HOW MANY STANDARD DRINKS CONTAINING ALCOHOL DO YOU HAVE ON A TYPICAL DAY: PATIENT DOES NOT DRINK
HOW OFTEN DO YOU HAVE A DRINK CONTAINING ALCOHOL: NEVER

## 2025-07-15 ENCOUNTER — OFFICE VISIT (OUTPATIENT)
Dept: PRIMARY CARE CLINIC | Age: 35
End: 2025-07-15
Payer: MEDICARE

## 2025-07-15 VITALS
BODY MASS INDEX: 49.8 KG/M2 | OXYGEN SATURATION: 98 % | WEIGHT: 293 LBS | HEART RATE: 102 BPM | SYSTOLIC BLOOD PRESSURE: 128 MMHG | DIASTOLIC BLOOD PRESSURE: 88 MMHG

## 2025-07-15 DIAGNOSIS — Z00.00 INITIAL MEDICARE ANNUAL WELLNESS VISIT: Primary | ICD-10-CM

## 2025-07-15 DIAGNOSIS — Z74.09 LIMITED MOBILITY: ICD-10-CM

## 2025-07-15 DIAGNOSIS — I10 PRIMARY HYPERTENSION: ICD-10-CM

## 2025-07-15 PROCEDURE — G0438 PPPS, INITIAL VISIT: HCPCS | Performed by: STUDENT IN AN ORGANIZED HEALTH CARE EDUCATION/TRAINING PROGRAM

## 2025-07-15 PROCEDURE — 3074F SYST BP LT 130 MM HG: CPT | Performed by: STUDENT IN AN ORGANIZED HEALTH CARE EDUCATION/TRAINING PROGRAM

## 2025-07-15 PROCEDURE — 3079F DIAST BP 80-89 MM HG: CPT | Performed by: STUDENT IN AN ORGANIZED HEALTH CARE EDUCATION/TRAINING PROGRAM

## 2025-07-15 NOTE — PROGRESS NOTES
Medicare Annual Wellness Visit    Janett Frazier is here for Hypertension (Patient states she does not check her at home, however she does have a cuff at home.) and Medicare AWV (Patient is here today for MAW. Three words given for patient to remember are phone, blue and open. The time given is a quarter past 11. )    Assessment & Plan   Initial Medicare annual wellness visit  BMI 45.0-49.9, adult (HCC)  Limited mobility  -     Handicap placard  Primary hypertension  -     CBC with Auto Differential; Future  -     Comprehensive Metabolic Panel; Future  -     Lipid Panel; Future     Overall, patient appears to be in fair health. BP in office is 128/88, which is borderline but at target with current regimen.    Normal clock draw and word recall.    Patient is making good efforts at dietary and lifestyle changes. Will continue to monitor and encourage efforts at this time.    Return in about 6 months (around 1/15/2026) for HTN recheck.     Subjective       Patient's complete Health Risk Assessment and screening values have been reviewed and are found in Flowsheets. The following problems were reviewed today and where indicated follow up appointments were made and/or referrals ordered.    Positive Risk Factor Screenings with Interventions:    Fall Risk:  Do you feel unsteady or are you worried about falling? : no  2 or more falls in past year?: no  Fall with injury in past year?: (!) yes  Interventions:    Reviewed medications, home hazards, visual acuity, and co-morbidities that can increase risk for falls             Inactivity:  On average, how many days per week do you engage in moderate to strenuous exercise (like a brisk walk)?: 0 days (!) Abnormal     Interventions:  Recommend increased activity - 150 minutes/week, or 30 minutes/day 5 days/week.    Poor Eating Habits/Diet:  Do you eat balanced/healthy meals regularly?: (!) No  Interventions:  Patient comments: follows with dietician    Abnormal BMI (obese):  Body